# Patient Record
Sex: FEMALE | Race: WHITE | Employment: STUDENT | URBAN - METROPOLITAN AREA
[De-identification: names, ages, dates, MRNs, and addresses within clinical notes are randomized per-mention and may not be internally consistent; named-entity substitution may affect disease eponyms.]

---
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
## 2024-04-08 ENCOUNTER — LAB ENCOUNTER (OUTPATIENT)
Dept: LAB | Age: 19
End: 2024-04-08
Attending: NURSE PRACTITIONER
Payer: COMMERCIAL

## 2024-04-08 DIAGNOSIS — F41.0 PANIC DISORDER WITHOUT AGORAPHOBIA: ICD-10-CM

## 2024-04-08 DIAGNOSIS — F42.2 MIXED OBSESSIONAL THOUGHTS AND ACTS: ICD-10-CM

## 2024-04-08 LAB
ALBUMIN SERPL-MCNC: 4 G/DL (ref 3.4–5)
ALBUMIN/GLOB SERPL: 1 {RATIO} (ref 1–2)
ALP LIVER SERPL-CCNC: 49 U/L
ALT SERPL-CCNC: 17 U/L
ANION GAP SERPL CALC-SCNC: 10 MMOL/L (ref 0–18)
AST SERPL-CCNC: 14 U/L (ref 15–37)
BASOPHILS # BLD AUTO: 0.05 X10(3) UL (ref 0–0.2)
BASOPHILS NFR BLD AUTO: 0.7 %
BILIRUB SERPL-MCNC: 1.3 MG/DL (ref 0.1–2)
BUN BLD-MCNC: 9 MG/DL (ref 9–23)
CALCIUM BLD-MCNC: 9.4 MG/DL (ref 8.5–10.1)
CHLORIDE SERPL-SCNC: 109 MMOL/L (ref 98–112)
CO2 SERPL-SCNC: 20 MMOL/L (ref 21–32)
CREAT BLD-MCNC: 0.92 MG/DL
DEPRECATED HBV CORE AB SER IA-ACNC: 38.2 NG/ML
EGFRCR SERPLBLD CKD-EPI 2021: 92 ML/MIN/1.73M2 (ref 60–?)
EOSINOPHIL # BLD AUTO: 0.44 X10(3) UL (ref 0–0.7)
EOSINOPHIL NFR BLD AUTO: 6 %
ERYTHROCYTE [DISTWIDTH] IN BLOOD BY AUTOMATED COUNT: 13 %
FASTING STATUS PATIENT QL REPORTED: YES
FOLATE SERPL-MCNC: 20 NG/ML (ref 8.7–?)
GLOBULIN PLAS-MCNC: 4 G/DL (ref 2.8–4.4)
GLUCOSE BLD-MCNC: 83 MG/DL (ref 70–99)
HCT VFR BLD AUTO: 43.6 %
HGB BLD-MCNC: 13.9 G/DL
IMM GRANULOCYTES # BLD AUTO: 0.01 X10(3) UL (ref 0–1)
IMM GRANULOCYTES NFR BLD: 0.1 %
IRON SATN MFR SERPL: 33 %
IRON SERPL-MCNC: 138 UG/DL
LYMPHOCYTES # BLD AUTO: 3.16 X10(3) UL (ref 1.5–5)
LYMPHOCYTES NFR BLD AUTO: 42.9 %
MCH RBC QN AUTO: 29 PG (ref 26–34)
MCHC RBC AUTO-ENTMCNC: 31.9 G/DL (ref 31–37)
MCV RBC AUTO: 90.8 FL
MONOCYTES # BLD AUTO: 0.59 X10(3) UL (ref 0.1–1)
MONOCYTES NFR BLD AUTO: 8 %
NEUTROPHILS # BLD AUTO: 3.12 X10 (3) UL (ref 1.5–7.7)
NEUTROPHILS # BLD AUTO: 3.12 X10(3) UL (ref 1.5–7.7)
NEUTROPHILS NFR BLD AUTO: 42.3 %
OSMOLALITY SERPL CALC.SUM OF ELEC: 286 MOSM/KG (ref 275–295)
PLATELET # BLD AUTO: 361 10(3)UL (ref 150–450)
POTASSIUM SERPL-SCNC: 3.7 MMOL/L (ref 3.5–5.1)
PROT SERPL-MCNC: 8 G/DL (ref 6.4–8.2)
RBC # BLD AUTO: 4.8 X10(6)UL
SODIUM SERPL-SCNC: 139 MMOL/L (ref 136–145)
T3FREE SERPL-MCNC: 4.03 PG/ML (ref 2.4–4.2)
T4 FREE SERPL-MCNC: 1.3 NG/DL (ref 0.9–1.6)
THYROGLOB SERPL-MCNC: <15 U/ML (ref ?–60)
THYROPEROXIDASE AB SERPL-ACNC: <28 U/ML (ref ?–60)
TIBC SERPL-MCNC: 422 UG/DL (ref 240–450)
TRANSFERRIN SERPL-MCNC: 283 MG/DL (ref 200–360)
TSI SER-ACNC: 5.78 MIU/ML (ref 0.36–3.74)
VIT B12 SERPL-MCNC: 250 PG/ML (ref 193–986)
VIT D+METAB SERPL-MCNC: 16.2 NG/ML (ref 30–100)
WBC # BLD AUTO: 7.4 X10(3) UL (ref 4–11)

## 2024-04-08 PROCEDURE — 82607 VITAMIN B-12: CPT

## 2024-04-08 PROCEDURE — 80053 COMPREHEN METABOLIC PANEL: CPT

## 2024-04-08 PROCEDURE — 36415 COLL VENOUS BLD VENIPUNCTURE: CPT

## 2024-04-08 PROCEDURE — 83550 IRON BINDING TEST: CPT

## 2024-04-08 PROCEDURE — 82728 ASSAY OF FERRITIN: CPT

## 2024-04-08 PROCEDURE — 85025 COMPLETE CBC W/AUTO DIFF WBC: CPT

## 2024-04-08 PROCEDURE — 84481 FREE ASSAY (FT-3): CPT

## 2024-04-08 PROCEDURE — 82306 VITAMIN D 25 HYDROXY: CPT

## 2024-04-08 PROCEDURE — 83540 ASSAY OF IRON: CPT

## 2024-04-08 PROCEDURE — 82746 ASSAY OF FOLIC ACID SERUM: CPT

## 2024-04-08 PROCEDURE — 86800 THYROGLOBULIN ANTIBODY: CPT

## 2024-04-08 PROCEDURE — 86376 MICROSOMAL ANTIBODY EACH: CPT

## 2024-04-08 PROCEDURE — 84443 ASSAY THYROID STIM HORMONE: CPT

## 2024-04-08 PROCEDURE — 84439 ASSAY OF FREE THYROXINE: CPT

## 2024-12-20 ENCOUNTER — E-VISIT (OUTPATIENT)
Dept: TELEHEALTH | Age: 19
End: 2024-12-20
Payer: COMMERCIAL

## 2024-12-20 DIAGNOSIS — K52.9 GASTROENTERITIS: Primary | ICD-10-CM

## 2024-12-20 DIAGNOSIS — Z02.89 ENCOUNTER TO OBTAIN EXCUSE FROM WORK: ICD-10-CM

## 2024-12-20 NOTE — PATIENT INSTRUCTIONS
Start the probiotic Florastor.  It contains saccharomyces boulardii.  It can help with diarrhea.    Start with small sips of fluids.   For hydration you can try:   Oral rehydration solutions such as pedialyte.    Diluted fruit juice along with saltine crackers and broths or soups.    Gatorade   Once able to tolerate solid foods, boiled starches such as potatoes, noodles, rice, wheat, and oat) with salt can be eaten if you have watery diarrhea.  Crackers, bananas, soup, and boiled vegetables are other options.  Foods with high fat content should be avoided until gut function returns to normal.  Advance to general diet as tolerated.    Dairy products (except yogurt) may be difficult to digest in the presence of diarrheal disease.  Temporary avoidance of lactose-containing foods may be helpful.   If vomiting recurs, allow stomach to rest briefly then start slowly again as above.  If unable to hold down fluids, then go to the ER.    Follow up with your primary care doctor if your symptoms are not improving in 1-2 days or sooner for new or worsening symptoms.  If you currently have a fever,  see your primary care physician if it does not resolve in next 48 hours.  No work until diarrhea and fever free for 24 hours  Call your healthcare provider care right away if you have any of these:   Bloody or black vomit or stools; vomit or stool that contains pus/mucus  Severe, steady belly pain  Vomiting with a severe headache or stiff neck  Vomiting after a head injury  Can't sip liquids after more than 12 hours  Vomiting that lasts more than 24 hours  Severe diarrhea that lasts more than 2 days  Fever over 100.4F does not resolve after 48 hours  Yellowish color to your skin or the whites of your eyes  Can't urinate

## 2024-12-20 NOTE — PROGRESS NOTES
Cornelia Duarte is a 19 year old female.  HPI:   See answers to questions above.     Current Outpatient Medications   Medication Sig Dispense Refill    fluvoxaMINE 100 MG Oral Tab Take 2 tablets (200 mg total) by mouth nightly. 180 tablet 1    LORazepam 0.5 MG Oral Tab Take 1 tablet (0.5 mg total) by mouth daily as needed for Anxiety. 30 tablet 2    methylphenidate (RITALIN) 5 MG Oral Tab Take 1 tablet (5 mg total) by mouth 2 (two) times daily. 60 tablet 0    [START ON 1/6/2025] methylphenidate (RITALIN) 5 MG Oral Tab Take 1 tablet (5 mg total) by mouth 2 (two) times daily. 60 tablet 0    [START ON 2/6/2025] methylphenidate (RITALIN) 5 MG Oral Tab Take 1 tablet (5 mg total) by mouth 2 (two) times daily. 60 tablet 0    cholecalciferol 1.25 MG (27285 UT) Oral Cap Take 1 capsule (50,000 Units total) by mouth every 7 days. 13 capsule 0    Norethin Ace-Eth Estrad-FE 1-20 MG-MCG Oral Tab Take 1 tablet by mouth daily.        No past medical history on file.   No past surgical history on file.   Family History   Problem Relation Age of Onset    Cancer Paternal Grandmother     Heart Disorder Paternal Grandfather         heart attack    Heart Disorder Maternal Uncle         congenital heart defect     Anxiety Maternal Uncle     Depression Maternal Uncle     Anxiety Father     Depression Father     Anxiety Mother     Depression Mother     Anxiety Other     Depression Other       Social History:  Social History     Socioeconomic History    Marital status: Single   Tobacco Use    Smoking status: Never    Smokeless tobacco: Never     Social Drivers of Health      [facility]    Social Connections    Received from [facility], [facility]    Housing Stability         ASSESSMENT AND PLAN:     Encounter Diagnoses   Name Primary?    Gastroenteritis Yes    Encounter to obtain excuse from work        Sx improving which is  reassuring. Continued supportive care. Work note sent via [place].    Meds & Refills for this Visit:  Requested Prescriptions      No prescriptions requested or ordered in this encounter       Duration of  the service:  20 minutes    Patient advised to follow up with PCP if no improvement or worsening of symptoms  Refer to "[employer] message for specific patient instructions

## 2025-03-20 ENCOUNTER — OFFICE VISIT (OUTPATIENT)
Dept: FAMILY MEDICINE CLINIC | Facility: CLINIC | Age: 20
End: 2025-03-20
Payer: COMMERCIAL

## 2025-03-20 VITALS
WEIGHT: 108.25 LBS | RESPIRATION RATE: 16 BRPM | HEIGHT: 65 IN | HEART RATE: 76 BPM | DIASTOLIC BLOOD PRESSURE: 70 MMHG | OXYGEN SATURATION: 96 % | TEMPERATURE: 97 F | SYSTOLIC BLOOD PRESSURE: 120 MMHG | BODY MASS INDEX: 18.03 KG/M2

## 2025-03-20 DIAGNOSIS — F41.9 ANXIETY AND DEPRESSION: ICD-10-CM

## 2025-03-20 DIAGNOSIS — R10.9 ABDOMINAL PAIN, UNSPECIFIED ABDOMINAL LOCATION: ICD-10-CM

## 2025-03-20 DIAGNOSIS — R79.89 ELEVATED TSH: Primary | ICD-10-CM

## 2025-03-20 DIAGNOSIS — G93.31 POSTVIRAL SYNDROME: ICD-10-CM

## 2025-03-20 DIAGNOSIS — R19.8 ALTERNATING CONSTIPATION AND DIARRHEA: ICD-10-CM

## 2025-03-20 DIAGNOSIS — R53.83 FATIGUE, UNSPECIFIED TYPE: ICD-10-CM

## 2025-03-20 DIAGNOSIS — F32.A ANXIETY AND DEPRESSION: ICD-10-CM

## 2025-03-20 PROCEDURE — 99204 OFFICE O/P NEW MOD 45 MIN: CPT | Performed by: STUDENT IN AN ORGANIZED HEALTH CARE EDUCATION/TRAINING PROGRAM

## 2025-03-20 RX ORDER — DICYCLOMINE HCL 20 MG
1 TABLET ORAL EVERY 6 HOURS PRN
COMMUNITY
Start: 2025-03-04 | End: 2025-04-03

## 2025-03-20 NOTE — PROGRESS NOTES
Subjective:      Chief Complaint   Patient presents with    Depression     Has been having trouble sleeping - PHQ9: 13, CSSR: 0    Cough     States she has been cough for a while    Weight Loss     hard time eating, GI issues and sweating alot    Anxiety     KALANI: 18     HISTORY OF PRESENT ILLNESS  HPI  HPI obtained per patient report.  Cornelia Duarte is a pleasant 19 year old female presenting with several concerns.     She reports several weeks of brain fog, memory problems, fatigue, hair loss, brittle nails, cold intolerance, diarrhea, constipation, abdominal pain after eating, excessive sweating throughout the day, and productive cough.   She follows up with her psychiatrist at [facility] who ordered some labs for her. She also recently consulted with a gastroenterologist who recommended endoscopic evaluation.     She clarifies that her cough is mildly productive of clear mucus, began with other URI symptoms a few weeks ago which have since resolved, and is improving over time.     PAST PATIENT HISTORY  Past Medical History:    Anxiety    Depression    OCD (obsessive compulsive disorder)    Vitamin D deficiency     History reviewed. No pertinent surgical history.    CURRENT MEDICATIONS  Medications Taking[1]    HEALTH MAINTENANCE  Immunization History   Administered Date(s) Administered    DTAP 12/20/2004, 02/22/2005, 04/22/2005, 05/18/2006    DTAP-IPV 08/04/2010    DTAP/HEP B/IPV Combined 12/22/2004, 02/22/2005, 04/22/2005    FLULAVAL 6 months & older 0.5 ml Prefilled syringe (75054) 09/13/2020, 10/07/2021, 10/09/2022    Fluvirin, 3 Years & >, Im 11/15/2006, 11/02/2007    HEP A,Ped/Adol,(2 Dose) 08/01/2013, 08/30/2014    HEP B/HIB 02/10/2006    HIB 12/22/2004, 02/22/2005, 07/25/2005    Hib, Unspecified Formulation 12/22/2004, 02/22/2005, 07/25/2005    Hpv Virus Vaccine 9 Mary Im 03/13/2024, 04/16/2024    IPV 12/22/2004, 02/22/2005, 04/22/2005, 05/18/2006, 08/04/2010    Influenza 11/02/2005, 11/15/2006, 10/29/2008,  10/27/2009, 10/04/2010, 02/12/2024    Influenza A, H1N1 Vaccine 10/27/2009    Intranasal (CPT=90660), Influenza Vaccine, [facility] 10/29/2008    MMR 11/02/2005    MMR/Varicella Combined 08/04/2010    Meningococcal-Menactra 06/14/2016    Meningococcal-Menveo 2month-55yr 10/09/2022    Pneumococcal (Prevnar 7) 12/22/2004, 02/22/2005, 04/22/2005, 02/10/2006    TDAP 06/14/2016    Varicella 11/02/2005       ALLERGIES AND DRUG REACTIONS  Allergies[2]    Family History   Problem Relation Age of Onset    Cancer Paternal Grandmother     Heart Disorder Paternal Grandfather         heart attack    Heart Disorder Maternal Uncle         congenital heart defect     Anxiety Maternal Uncle     Depression Maternal Uncle     Anxiety Father     Depression Father     Anxiety Mother     Depression Mother     Anxiety Other     Depression Other      Social History     Socioeconomic History    Marital status: Single   Tobacco Use    Smoking status: Never    Smokeless tobacco: Never   Vaping Use    Vaping status: Every Day   Substance and Sexual Activity    Alcohol use: Not Currently    Drug use: Yes   Other Topics Concern    Caffeine Concern No    Exercise No    Seat Belt No    Special Diet No    Stress Concern No    Weight Concern No     Social Drivers of Health      [facility]    Housing Stability       Review of Systems   Constitutional:  Positive for diaphoresis and fatigue.   Respiratory:  Positive for cough.    Gastrointestinal:  Positive for abdominal pain, constipation and diarrhea.   All other systems reviewed and are negative.         Objective:      /70   Pulse 76   Temp 97 °F (36.1 °C) (Temporal)   Resp 16   Ht 5' 5\" (1.651 m)   Wt 108 lb 4 oz (49.1 kg)   LMP 02/27/2025 (Approximate)   SpO2 96%   BMI 18.01 kg/m²   Body mass index is 18.01 kg/m².    Physical Exam  Vitals reviewed.   Constitutional:       General: She is not in acute distress.      Appearance: She is normal weight. She is not ill-appearing, toxic-appearing or diaphoretic.   HENT:      Head: Normocephalic and atraumatic.      Nose: Nose normal.      Mouth/Throat:      Mouth: Mucous membranes are moist.      Pharynx: Oropharynx is clear.   Eyes:      General: No scleral icterus.        Right eye: No discharge.         Left eye: No discharge.      Extraocular Movements: Extraocular movements intact.      Conjunctiva/sclera: Conjunctivae normal.      Pupils: Pupils are equal, round, and reactive to light.   Neck:      Thyroid: No thyroid mass, thyromegaly or thyroid tenderness.   Cardiovascular:      Rate and Rhythm: Normal rate and regular rhythm.      Heart sounds: Normal heart sounds.   Pulmonary:      Effort: Pulmonary effort is normal.      Breath sounds: Normal breath sounds.   Abdominal:      General: Abdomen is flat. Bowel sounds are normal. There is no distension.      Palpations: Abdomen is soft. There is no mass.      Tenderness: There is abdominal tenderness (mild suprapubic tenderness). There is no guarding or rebound.      Hernia: No hernia is present.   Musculoskeletal:      Cervical back: Neck supple. No tenderness.      Right lower leg: No edema.      Left lower leg: No edema.   Lymphadenopathy:      Cervical: No cervical adenopathy.   Skin:     General: Skin is warm and dry.      Coloration: Skin is not jaundiced or pale.      Findings: No bruising, erythema or rash.   Neurological:      Mental Status: She is alert and oriented to person, place, and time.            Assessment and Plan:      1. Elevated TSH (Primary)  2. Fatigue, unspecified type  -     Vitamin B12; Future; Expected date: 03/20/2025  -     Iron And Tibc; Future; Expected date: 03/20/2025  -     Ferritin; Future; Expected date: 03/20/2025  3. Anxiety and depression  4. Abdominal pain, unspecified abdominal location  5. Alternating constipation and diarrhea  6. Postviral syndrome    No follow-ups on file.    - her  lab results 4/8/24 showed elevated TSH  - recommended updating her fasting lab panel at her earliest convenience. In addition to the lab orders in her chart placed by her psychiatrist, we will add on an iron panel and a B12 level for further evaluation  - further recommendations will be pending her lab results     - we discussed that her cough is consistent with a postviral cough and that it is expected to improve within the next few weeks with supportive care     Patient verbalized understanding of assessment and recommendations. All questions and concerns were addressed.    Electronically signed by Gen Sim MD         [1]   Outpatient Medications Marked as Taking for the 3/20/25 encounter (Office Visit) with Gen Sim MD   Medication Sig Dispense Refill    dicyclomine 20 MG Oral Tab Take 1 tablet (20 mg total) by mouth every 6 (six) hours as needed.      fluvoxaMINE 100 MG Oral Tab Take 2.5 tablets (250 mg total) by mouth nightly. 225 tablet 1    LORazepam 0.5 MG Oral Tab Take 1 tablet (0.5 mg total) by mouth daily as needed for Anxiety. 30 tablet 2    methylphenidate (RITALIN) 5 MG Oral Tab Take 1 tablet (5 mg total) by mouth 2 (two) times daily. 60 tablet 0    [START ON 4/5/2025] methylphenidate (RITALIN) 5 MG Oral Tab Take 1 tablet (5 mg total) by mouth 2 (two) times daily. 60 tablet 0    [START ON 5/6/2025] methylphenidate (RITALIN) 5 MG Oral Tab Take 1 tablet (5 mg total) by mouth 2 (two) times daily. 60 tablet 0    cholecalciferol 1.25 MG (60815 UT) Oral Cap Take 1 capsule (50,000 Units total) by mouth every 7 days. 13 capsule 0    Norethin Ace-Eth Estrad-FE 1-20 MG-MCG Oral Tab Take 1 tablet by mouth daily.     [2]   Allergies  Allergen Reactions    Seasonal OTHER (SEE COMMENTS)     stuffy

## 2025-03-22 ENCOUNTER — LAB ENCOUNTER (OUTPATIENT)
Dept: LAB | Age: 20
End: 2025-03-22
Attending: STUDENT IN AN ORGANIZED HEALTH CARE EDUCATION/TRAINING PROGRAM
Payer: COMMERCIAL

## 2025-03-22 DIAGNOSIS — R79.89 ABNORMAL SERUM THYROID STIMULATING HORMONE (TSH) LEVEL: ICD-10-CM

## 2025-03-22 DIAGNOSIS — E55.9 VITAMIN D DEFICIENCY: ICD-10-CM

## 2025-03-22 DIAGNOSIS — R53.83 FATIGUE, UNSPECIFIED TYPE: ICD-10-CM

## 2025-03-22 DIAGNOSIS — F42.2 MIXED OBSESSIONAL THOUGHTS AND ACTS: ICD-10-CM

## 2025-03-22 LAB
ALBUMIN SERPL-MCNC: 5.6 G/DL (ref 3.2–4.8)
ALBUMIN/GLOB SERPL: 1.7 {RATIO} (ref 1–2)
ALP LIVER SERPL-CCNC: 75 U/L
ALT SERPL-CCNC: 10 U/L
ANION GAP SERPL CALC-SCNC: 15 MMOL/L (ref 0–18)
AST SERPL-CCNC: 22 U/L (ref ?–34)
BASOPHILS # BLD AUTO: 0.05 X10(3) UL (ref 0–0.2)
BASOPHILS NFR BLD AUTO: 0.5 %
BILIRUB SERPL-MCNC: 0.8 MG/DL (ref 0.3–1.2)
BUN BLD-MCNC: 8 MG/DL (ref 9–23)
CALCIUM BLD-MCNC: 10.3 MG/DL (ref 8.7–10.6)
CHLORIDE SERPL-SCNC: 104 MMOL/L (ref 98–112)
CHOLEST SERPL-MCNC: 166 MG/DL (ref ?–200)
CO2 SERPL-SCNC: 21 MMOL/L (ref 21–32)
CREAT BLD-MCNC: 0.85 MG/DL
DEPRECATED HBV CORE AB SER IA-ACNC: 85 NG/ML
EGFRCR SERPLBLD CKD-EPI 2021: 101 ML/MIN/1.73M2 (ref 60–?)
EOSINOPHIL # BLD AUTO: 0.35 X10(3) UL (ref 0–0.7)
EOSINOPHIL NFR BLD AUTO: 3.8 %
ERYTHROCYTE [DISTWIDTH] IN BLOOD BY AUTOMATED COUNT: 12.4 %
FASTING PATIENT LIPID ANSWER: YES
FASTING STATUS PATIENT QL REPORTED: YES
GLOBULIN PLAS-MCNC: 3.3 G/DL (ref 2–3.5)
GLUCOSE BLD-MCNC: 89 MG/DL (ref 70–99)
HCT VFR BLD AUTO: 41.5 %
HDLC SERPL-MCNC: 38 MG/DL (ref 40–59)
HGB BLD-MCNC: 15 G/DL
IMM GRANULOCYTES # BLD AUTO: 0.03 X10(3) UL (ref 0–1)
IMM GRANULOCYTES NFR BLD: 0.3 %
IRON SATN MFR SERPL: 12 %
IRON SERPL-MCNC: 37 UG/DL
LDLC SERPL CALC-MCNC: 114 MG/DL (ref ?–100)
LYMPHOCYTES # BLD AUTO: 1.65 X10(3) UL (ref 1–4)
LYMPHOCYTES NFR BLD AUTO: 17.7 %
MCH RBC QN AUTO: 31.7 PG (ref 26–34)
MCHC RBC AUTO-ENTMCNC: 36.1 G/DL (ref 31–37)
MCV RBC AUTO: 87.7 FL
MONOCYTES # BLD AUTO: 0.67 X10(3) UL (ref 0.1–1)
MONOCYTES NFR BLD AUTO: 7.2 %
NEUTROPHILS # BLD AUTO: 6.58 X10 (3) UL (ref 1.5–7.7)
NEUTROPHILS # BLD AUTO: 6.58 X10(3) UL (ref 1.5–7.7)
NEUTROPHILS NFR BLD AUTO: 70.5 %
NONHDLC SERPL-MCNC: 128 MG/DL (ref ?–130)
OSMOLALITY SERPL CALC.SUM OF ELEC: 288 MOSM/KG (ref 275–295)
PLATELET # BLD AUTO: 420 10(3)UL (ref 150–450)
POTASSIUM SERPL-SCNC: 3.5 MMOL/L (ref 3.5–5.1)
PROT SERPL-MCNC: 8.9 G/DL (ref 5.7–8.2)
RBC # BLD AUTO: 4.73 X10(6)UL
SODIUM SERPL-SCNC: 140 MMOL/L (ref 136–145)
T4 FREE SERPL-MCNC: 1.5 NG/DL (ref 0.8–1.7)
TOTAL IRON BINDING CAPACITY: 311 UG/DL (ref 250–425)
TRANSFERRIN SERPL-MCNC: 256 MG/DL (ref 250–380)
TRIGL SERPL-MCNC: 72 MG/DL (ref 30–149)
TSI SER-ACNC: 2.71 UIU/ML (ref 0.55–4.78)
VIT B12 SERPL-MCNC: 372 PG/ML (ref 211–911)
VIT D+METAB SERPL-MCNC: 32 NG/ML (ref 30–100)
VLDLC SERPL CALC-MCNC: 12 MG/DL (ref 0–30)
WBC # BLD AUTO: 9.3 X10(3) UL (ref 4–11)

## 2025-03-22 PROCEDURE — 82306 VITAMIN D 25 HYDROXY: CPT

## 2025-03-22 PROCEDURE — 83550 IRON BINDING TEST: CPT

## 2025-03-22 PROCEDURE — 36415 COLL VENOUS BLD VENIPUNCTURE: CPT

## 2025-03-22 PROCEDURE — 84439 ASSAY OF FREE THYROXINE: CPT

## 2025-03-22 PROCEDURE — 84443 ASSAY THYROID STIM HORMONE: CPT

## 2025-03-22 PROCEDURE — 83540 ASSAY OF IRON: CPT

## 2025-03-22 PROCEDURE — 82607 VITAMIN B-12: CPT

## 2025-03-22 PROCEDURE — 82728 ASSAY OF FERRITIN: CPT

## 2025-03-22 PROCEDURE — 85025 COMPLETE CBC W/AUTO DIFF WBC: CPT

## 2025-03-22 PROCEDURE — 80061 LIPID PANEL: CPT

## 2025-03-22 PROCEDURE — 80053 COMPREHEN METABOLIC PANEL: CPT

## 2025-03-24 NOTE — PROGRESS NOTES
Hussein Locke,     Your lab results showed mild dyslipidemia (mildly abnormal cholesterol) and mild iron deficiency but were otherwise normal. Your thyroid function levels were normal. Unfortunately your lab results do not indicate a clear underlying cause for your symptoms, so I would strongly recommend follow-up with a gastroenterologist for endoscopic evaluation. Increasing your intake of fiber, iron-rich foods, and omega-3s are recommended to help improve your cholesterol and iron levels. Thyroid function levels can fluctuate over time, so it would be recommended to monitor your TSH every 6-12 months at this time. Please let me know if you have any questions.     Dr. Sim

## (undated) NOTE — LETTER
Date: 12/20/2024    Patient Name: Cornelia Duarte          To Whom it may concern:    This letter has been written at the patient's request. The above patient was seen via telehealth encounter at [facility] for evaluation of a medical condition.    This patient should be excused from attending work from 12/20/2024 through 12/22/2024.          Sincerely,        GUY Lorenzana, PA-C  [facility]/Telemedicine Physician Assistant  [facility]